# Patient Record
Sex: FEMALE | HISPANIC OR LATINO
[De-identification: names, ages, dates, MRNs, and addresses within clinical notes are randomized per-mention and may not be internally consistent; named-entity substitution may affect disease eponyms.]

---

## 2017-10-10 ENCOUNTER — FORM ENCOUNTER (OUTPATIENT)
Age: 47
End: 2017-10-10

## 2017-10-11 ENCOUNTER — FORM ENCOUNTER (OUTPATIENT)
Age: 47
End: 2017-10-11

## 2017-10-16 ENCOUNTER — FORM ENCOUNTER (OUTPATIENT)
Age: 47
End: 2017-10-16

## 2017-10-29 ENCOUNTER — FORM ENCOUNTER (OUTPATIENT)
Age: 47
End: 2017-10-29

## 2018-03-08 ENCOUNTER — FORM ENCOUNTER (OUTPATIENT)
Age: 48
End: 2018-03-08

## 2019-02-14 ENCOUNTER — FORM ENCOUNTER (OUTPATIENT)
Age: 49
End: 2019-02-14

## 2019-03-11 ENCOUNTER — FORM ENCOUNTER (OUTPATIENT)
Age: 49
End: 2019-03-11

## 2019-03-14 ENCOUNTER — FORM ENCOUNTER (OUTPATIENT)
Age: 49
End: 2019-03-14

## 2019-09-15 ENCOUNTER — FORM ENCOUNTER (OUTPATIENT)
Age: 49
End: 2019-09-15

## 2020-02-16 ENCOUNTER — FORM ENCOUNTER (OUTPATIENT)
Age: 50
End: 2020-02-16

## 2020-08-20 ENCOUNTER — FORM ENCOUNTER (OUTPATIENT)
Age: 50
End: 2020-08-20

## 2020-08-26 ENCOUNTER — FORM ENCOUNTER (OUTPATIENT)
Age: 50
End: 2020-08-26

## 2021-02-19 RX ORDER — AMLODIPINE BESYLATE 5 MG/1
5 TABLET ORAL
Refills: 0 | Status: ACTIVE | COMMUNITY

## 2021-02-22 ENCOUNTER — APPOINTMENT (OUTPATIENT)
Dept: BREAST CENTER | Facility: CLINIC | Age: 51
End: 2021-02-22
Payer: COMMERCIAL

## 2021-02-22 VITALS
BODY MASS INDEX: 30.34 KG/M2 | WEIGHT: 182.13 LBS | SYSTOLIC BLOOD PRESSURE: 155 MMHG | HEART RATE: 74 BPM | DIASTOLIC BLOOD PRESSURE: 91 MMHG | HEIGHT: 65 IN

## 2021-02-22 DIAGNOSIS — Z00.00 ENCOUNTER FOR GENERAL ADULT MEDICAL EXAMINATION W/OUT ABNORMAL FINDINGS: ICD-10-CM

## 2021-02-22 DIAGNOSIS — Z86.79 PERSONAL HISTORY OF OTHER DISEASES OF THE CIRCULATORY SYSTEM: ICD-10-CM

## 2021-02-22 PROCEDURE — 99072 ADDL SUPL MATRL&STAF TM PHE: CPT

## 2021-02-22 PROCEDURE — 99213 OFFICE O/P EST LOW 20 MIN: CPT

## 2021-02-22 RX ORDER — PROPRANOLOL HYDROCHLORIDE 80 MG/1
TABLET ORAL
Refills: 0 | Status: DISCONTINUED | COMMUNITY
End: 2021-02-22

## 2022-02-24 ENCOUNTER — APPOINTMENT (OUTPATIENT)
Dept: BREAST CENTER | Facility: CLINIC | Age: 52
End: 2022-02-24
Payer: COMMERCIAL

## 2022-02-24 ENCOUNTER — NON-APPOINTMENT (OUTPATIENT)
Age: 52
End: 2022-02-24

## 2022-02-24 VITALS
BODY MASS INDEX: 29.99 KG/M2 | DIASTOLIC BLOOD PRESSURE: 87 MMHG | HEIGHT: 65 IN | HEART RATE: 64 BPM | WEIGHT: 180 LBS | OXYGEN SATURATION: 100 % | SYSTOLIC BLOOD PRESSURE: 145 MMHG

## 2022-02-24 DIAGNOSIS — N64.9 DISORDER OF BREAST, UNSPECIFIED: ICD-10-CM

## 2022-02-24 DIAGNOSIS — Z78.9 OTHER SPECIFIED HEALTH STATUS: ICD-10-CM

## 2022-02-24 PROCEDURE — 99213 OFFICE O/P EST LOW 20 MIN: CPT

## 2023-02-07 ENCOUNTER — NON-APPOINTMENT (OUTPATIENT)
Age: 53
End: 2023-02-07

## 2023-03-02 ENCOUNTER — APPOINTMENT (OUTPATIENT)
Dept: BREAST CENTER | Facility: CLINIC | Age: 53
End: 2023-03-02

## 2023-03-06 ENCOUNTER — NON-APPOINTMENT (OUTPATIENT)
Age: 53
End: 2023-03-06

## 2023-03-07 ENCOUNTER — APPOINTMENT (OUTPATIENT)
Dept: BREAST CENTER | Facility: CLINIC | Age: 53
End: 2023-03-07
Payer: COMMERCIAL

## 2023-03-07 VITALS
HEIGHT: 65 IN | WEIGHT: 195 LBS | HEART RATE: 69 BPM | SYSTOLIC BLOOD PRESSURE: 119 MMHG | DIASTOLIC BLOOD PRESSURE: 75 MMHG | BODY MASS INDEX: 32.49 KG/M2

## 2023-03-07 DIAGNOSIS — R92.2 INCONCLUSIVE MAMMOGRAM: ICD-10-CM

## 2023-03-07 PROCEDURE — 99212 OFFICE O/P EST SF 10 MIN: CPT

## 2023-03-07 NOTE — DATA REVIEWED
[FreeTextEntry1] : \par 1/24/17: B/l MG- no suspicious masses or calcifications\par \par 1/24/17: B/l US- R : simple cyst 11:00 5FN, L : simple cysts; stable subcm nodules at 10:00 and 11:30 - stable, - 1 cm hypoechoic nodule 6:00 retroareolar, enlarged from 0.6 cm, biopsy recommended\par \par 2/9/17: L US core bx 1cm mass 6:00 retroareolar- intraductal papilloma\par \par 10/6/17: L MG- 1.2 cm circumscribed mass UIQ malka to palpable area of concern\par \par 10/6/17: L US- 2cm simple cyst 11:00 5 FN, malka to palpable\par \par 10/17/17: L Needle Loc Exc Bx- IDP\par \par 2/14/18: B/l MG- dense breasts\par \par 2/18/19: B/l MG & US- dense breasts, new 0.8 cm complex cyst in right-biopsy recommended\par \par 3/12/18: R US core bx- benign, concordant. (S clip) Rec 6 month f/u US.\par \par 9/16/19: R US- WNL\par \par 2/17/20: B/l MG & US- dense, bilateral cysts, right cluster of cysts-6 month follow-up recommended\par \par 8/21/20: R US- simple cyst\par \par 2/22/21: B/l MG & US- CAR\par \par 8/26/21: L MG (for pain)- CAR. BI-RADS 2\par \par 2/24/22: B/l MG & US- heterogenously dense. L 1.1cm stable nodule UOQ. B/l cysts. CAR. BI-RADS 2 \par \par 3/7/23 ([facility]) B/L mammogram/US: heterogeneously dense, b/l small cysts. No mammographic/sonographic evidence of malignancy. Benign BIRADS 2 \par

## 2023-03-07 NOTE — PAST MEDICAL HISTORY
[Perimenopausal] : The patient is perimenopausal [Menarche Age ____] : age at menarche was [unfilled] [Approximately ___] : the LMP was approximately [unfilled] [Total Preg ___] : G[unfilled] [Live Births ___] : P[unfilled]  [Age At Live Birth ___] : Age at live birth: [unfilled] [History of Hormone Replacement Treatment] : has no history of hormone replacement treatment [FreeTextEntry6] : never [FreeTextEntry7] : yes h/o oral contraceptive pill use [FreeTextEntry8] : yes

## 2023-03-07 NOTE — HISTORY OF PRESENT ILLNESS
[FreeTextEntry1] : Patient is a 53 yo female who presents for breast cancer screening. 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 Patient has no breast complaints. Denies palpable abnormalities of the breast, no skin changes/dimpling, no nipple discharge bilaterally.\par \par AVIS lifetime risk of 17.2%; Denies family history of breast or ovarian cancer.

## 2023-03-07 NOTE — PHYSICAL EXAM
[Normocephalic] : normocephalic [Supple] : supple [No Supraclavicular Adenopathy] : no supraclavicular adenopathy [Examined in the supine and seated position] : examined in the supine and seated position [No dominant masses] : no dominant masses in right breast  [No dominant masses] : no dominant masses left breast [No Nipple Retraction] : no left nipple retraction [No Nipple Discharge] : no left nipple discharge [No Axillary Lymphadenopathy] : no left axillary lymphadenopathy [No Edema] : no edema [No Swelling] : no swelling [Full ROM] : full range of motion [de-identified] : circumareolar incision well healed

## 2024-03-14 ENCOUNTER — NON-APPOINTMENT (OUTPATIENT)
Age: 54
End: 2024-03-14

## 2024-03-14 ENCOUNTER — APPOINTMENT (OUTPATIENT)
Dept: BREAST CENTER | Facility: CLINIC | Age: 54
End: 2024-03-14
Payer: COMMERCIAL

## 2024-03-14 VITALS
SYSTOLIC BLOOD PRESSURE: 126 MMHG | HEIGHT: 64 IN | BODY MASS INDEX: 32.44 KG/M2 | HEART RATE: 65 BPM | WEIGHT: 190 LBS | DIASTOLIC BLOOD PRESSURE: 86 MMHG

## 2024-03-14 DIAGNOSIS — Z12.39 ENCOUNTER FOR OTHER SCREENING FOR MALIGNANT NEOPLASM OF BREAST: ICD-10-CM

## 2024-03-14 DIAGNOSIS — Z78.9 OTHER SPECIFIED HEALTH STATUS: ICD-10-CM

## 2024-03-14 PROCEDURE — 99213 OFFICE O/P EST LOW 20 MIN: CPT

## 2024-03-14 NOTE — PAST MEDICAL HISTORY
[FreeTextEntry6] : never [History of Hormone Replacement Treatment] : has no history of hormone replacement treatment [FreeTextEntry7] : yes h/o oral contraceptive pill use [FreeTextEntry8] : yes

## 2024-03-14 NOTE — PHYSICAL EXAM
[de-identified] : Bilateral Breast/Axilla/Supraclavicular Area: no masses, discharge, or adenopathy  No...

## 2024-03-14 NOTE — HISTORY OF PRESENT ILLNESS
[FreeTextEntry1] : Patient is a 54yo F who presents for breast cancer screening. Hx of LEFT excisional bx for IDP 10/2017 (age 46). No fhx of breast or ovarian cancer. Patient denies palpable masses, skin changes, or nipple discharge bilaterally.  AVIS Lifetime Risk- 17.2%  1/24/17: B/l MG- no suspicious masses or calcifications 1/24/17: B/l US- R : simple cyst 11:00 5FN, L : simple cysts; stable subcm nodules at 10:00 and 11:30 - stable, - 1 cm hypoechoic nodule 6:00 retroareolar, enlarged from 0.6 cm, biopsy recommended 2/9/17: L US core bx 1cm mass 6:00 retroareolar- intraductal papilloma 10/6/17: L MG- 1.2 cm circumscribed mass UIQ malka to palpable area of concern 10/6/17: L US- 2cm simple cyst 11:00 5 FN, malka to palpable 10/17/17: L Needle Loc Exc Bx- IDP 2/14/18: B/l MG- dense breasts 2/18/19: B/l MG & US- dense breasts, new 0.8 cm complex cyst in right-biopsy recommended 3/12/18: R US core bx- benign, concordant. (S clip) Rec 6 month f/u US. 9/16/19: R US- WNL 2/17/20: B/l MG & US- dense, bilateral cysts, right cluster of cysts-6 month follow-up recommended 8/21/20: R US- simple cyst. 2/22/21: B/l MG & US- CAR 8/26/21: L MG (for pain)- CAR. BI-RADS 2 2/24/22: B/l MG & US- heterogeneously dense. L 1.1cm stable nodule UOQ. B/l cysts. CAR. BI-RADS 2 3/7/23: B/l MG & US- heterogeneously dense, b/l small cysts. CAR. BI-RADS 2 3/14/24: B/l MG & US- heterogeneously dense, US- R 1.5 cm cluster of cysts 12:00 6FN, L 0.7 cm cyst 11:00 10FN CAR. BIRADS 2.

## 2024-03-14 NOTE — REVIEW OF SYSTEMS
[Fever] : no fever [Chills] : no chills [Cough] : no cough [Shortness Of Breath] : no shortness of breath [Skin Lesions] : no skin lesions [Skin Wound] : no skin wound

## 2025-03-20 ENCOUNTER — APPOINTMENT (OUTPATIENT)
Dept: BREAST CENTER | Facility: CLINIC | Age: 55
End: 2025-03-20
Payer: COMMERCIAL

## 2025-03-20 ENCOUNTER — NON-APPOINTMENT (OUTPATIENT)
Age: 55
End: 2025-03-20

## 2025-03-20 VITALS
HEIGHT: 64 IN | DIASTOLIC BLOOD PRESSURE: 83 MMHG | BODY MASS INDEX: 33.63 KG/M2 | HEART RATE: 71 BPM | WEIGHT: 197 LBS | SYSTOLIC BLOOD PRESSURE: 137 MMHG

## 2025-03-20 DIAGNOSIS — R92.30 DENSE BREASTS, UNSPECIFIED: ICD-10-CM

## 2025-03-20 DIAGNOSIS — Z12.39 ENCOUNTER FOR OTHER SCREENING FOR MALIGNANT NEOPLASM OF BREAST: ICD-10-CM

## 2025-03-20 DIAGNOSIS — R92.8 OTHER ABNORMAL AND INCONCLUSIVE FINDINGS ON DIAGNOSTIC IMAGING OF BREAST: ICD-10-CM

## 2025-03-20 PROCEDURE — 99214 OFFICE O/P EST MOD 30 MIN: CPT

## 2025-03-26 ENCOUNTER — RESULT REVIEW (OUTPATIENT)
Age: 55
End: 2025-03-26

## 2025-04-01 ENCOUNTER — NON-APPOINTMENT (OUTPATIENT)
Age: 55
End: 2025-04-01